# Patient Record
Sex: FEMALE | Race: WHITE | NOT HISPANIC OR LATINO | URBAN - NONMETROPOLITAN AREA
[De-identification: names, ages, dates, MRNs, and addresses within clinical notes are randomized per-mention and may not be internally consistent; named-entity substitution may affect disease eponyms.]

---

## 2018-07-11 PROBLEM — H43.813: Noted: 2018-07-11

## 2018-07-11 PROBLEM — H43.811: Noted: 2018-07-11

## 2018-07-11 PROBLEM — H35.3131: Noted: 2018-07-11

## 2018-07-11 PROBLEM — Z96.1: Noted: 2018-01-24

## 2018-07-11 PROBLEM — H35.373: Noted: 2018-07-11

## 2018-07-11 PROBLEM — H52.4: Noted: 2018-11-27

## 2018-11-27 PROBLEM — H35.373: Noted: 2018-07-11

## 2018-11-27 PROBLEM — Z96.1: Noted: 2018-01-24

## 2018-11-27 PROBLEM — H35.3131: Noted: 2018-07-11

## 2018-11-27 PROBLEM — H43.813: Noted: 2018-07-11

## 2018-11-27 PROBLEM — H52.4: Noted: 2018-11-27

## 2019-06-12 ENCOUNTER — IMPORTED ENCOUNTER (OUTPATIENT)
Dept: URBAN - NONMETROPOLITAN AREA CLINIC 1 | Facility: CLINIC | Age: 74
End: 2019-06-12

## 2019-06-12 PROCEDURE — 92014 COMPRE OPH EXAM EST PT 1/>: CPT

## 2019-06-12 PROCEDURE — 92134 CPTRZ OPH DX IMG PST SGM RTA: CPT

## 2019-06-12 NOTE — PATIENT DISCUSSION
ARMD ERM OU:  -  Discussed diagnosis in detail w/ pt  today-  OCT done today stable OU with drusen noted. -  Optos done previously shows drusen OU but stable from previous -  IVFA done by Dr. Kacey Almanzar in the past (8/12/16). -  Continue to take eye vitamins daily such as Preservision-  Continue to follow the [address] [place] [address] patient to call or come into the office if any changes noted from today-  Continue to monitor PVD OD w/extensive floaters:-  Discussed findings of exam in detail with the patient.-  Stable on exam today with no holes tears or detachements noted. -  The risk of retinal detachment in patients with PVDs was discussed with the patient and the warning signs of retinal detachment were carefully reviewed with the patient. -  The patient was warned to return to the office or contact the ophthalmologist on call immediately if they experience signs of retinal detachment or changes in vision noted from today. -  Continue to monitor PRN. P/C IOL (TMF) OU-  Discussed diagnosis in detail w/ pt today-  Stable doing well. -  Monitor yearly or PRN.  Presbyopia OU-  Discussed diagnosis in detail with patient -  Continue to monitor; Dr's Notes: RM 11/27/18 (AOA)DFE  6/12/19OCT mac  6/12/19Optos  11/27/18

## 2019-12-12 ENCOUNTER — IMPORTED ENCOUNTER (OUTPATIENT)
Dept: URBAN - NONMETROPOLITAN AREA CLINIC 1 | Facility: CLINIC | Age: 74
End: 2019-12-12

## 2019-12-12 PROCEDURE — 92250 FUNDUS PHOTOGRAPHY W/I&R: CPT

## 2019-12-12 PROCEDURE — 92014 COMPRE OPH EXAM EST PT 1/>: CPT

## 2019-12-12 NOTE — PATIENT DISCUSSION
ARMD ERM OU:  -  Discussed diagnosis in detail w/ pt  today-  OCT done previously stable OU with drusen noted. -  Optos done today shows drusen OU but stable from previous -  IVFA done by Dr. Rosa Hayes in the past (8/12/16). -  Continue to take eye vitamins daily such as Preservision-  Continue to follow the [address] [place] [address] patient to call or come into the office if any changes noted from today-  Continue to monitor PVD OD w/extensive floaters:-  Discussed findings of exam in detail with the patient.-  Stable on exam today with no holes tears or detachements noted. -  The risk of retinal detachment in patients with PVDs was discussed with the patient and the warning signs of retinal detachment were carefully reviewed with the patient. -  The patient was warned to return to the office or contact the ophthalmologist on call immediately if they experience signs of retinal detachment or changes in vision noted from today. -  Continue to monitor PRN. P/C IOL (TMF) OU-  Discussed diagnosis in detail w/ pt today-  Stable doing well. -  Monitor yearly or PRN.  Presbyopia OU-  Discussed diagnosis in detail with patient -  New glasses Rx given today-  Continue to monitor; Dr's Notes: RM 12/12/19 (AOA)DFE  12/12/19OCT mac  6/12/19Optos  12/12/19

## 2020-07-28 ENCOUNTER — IMPORTED ENCOUNTER (OUTPATIENT)
Dept: URBAN - NONMETROPOLITAN AREA CLINIC 1 | Facility: CLINIC | Age: 75
End: 2020-07-28

## 2020-07-28 PROCEDURE — 92014 COMPRE OPH EXAM EST PT 1/>: CPT

## 2020-07-28 PROCEDURE — 92134 CPTRZ OPH DX IMG PST SGM RTA: CPT

## 2020-07-28 NOTE — PATIENT DISCUSSION
ARMD ERM OU:  -  Discussed diagnosis in detail w/ pt  today-  OCT done today shows drusen and ERM OU but stable from previous -  Optos done previously shows drusen OU but stable from previous -  IVFA done by Dr. Jasiel Carpio in the past (8/12/16). -  Continue to take eye vitamins daily such as Preservision-  Continue to follow the [facility] patient to call or come into the office if any changes noted from today. Patient reports no changes in her grid 7/28/20-  Continue to monitor PVD OD w/extensive floaters:-  Discussed findings of exam in detail with the patient.-  Stable on exam today with no holes tears or detachements noted. -  The risk of retinal detachment in patients with PVDs was discussed with the patient and the warning signs of retinal detachment were carefully reviewed with the patient. -  The patient was warned to return to the office or contact the ophthalmologist on call immediately if they experience signs of retinal detachment or changes in vision noted from today. -  Continue to monitor PRN. P/C IOL (TMF) OU-  Discussed diagnosis in detail w/ pt today-  Stable doing well. -  Monitor yearly or PRN.  Presbyopia OU-  Discussed diagnosis in detail with patient -  Continue to monitor; Dr's Notes: RM 12/12/19 (AOA)DFE  12/12/19OCT mac  7/28/20Optos  12/12/19

## 2021-02-17 ENCOUNTER — IMPORTED ENCOUNTER (OUTPATIENT)
Dept: URBAN - NONMETROPOLITAN AREA CLINIC 1 | Facility: CLINIC | Age: 76
End: 2021-02-17

## 2021-02-17 PROCEDURE — 92250 FUNDUS PHOTOGRAPHY W/I&R: CPT

## 2021-02-17 PROCEDURE — 92014 COMPRE OPH EXAM EST PT 1/>: CPT

## 2021-02-17 NOTE — PATIENT DISCUSSION
ARMD ERM OU:  -  Discussed diagnosis in detail w/ pt  today-  OCT done previously shows drusen and ERM OU but stable from previous -  Optos done today shows drusen OU but stable from previous -  IVFA done by Dr. Nori Solorzano in the past (8/12/16). -  Continue to take eye vitamins daily such as Preservision-  Continue to follow the [address] [facility] patient to call or come into the office if any changes noted from today. Patient reports no changes in her grid 2/17/20-  Continue to monitor PVD OD w/extensive floaters:-  Discussed findings of exam in detail with the patient.-  Stable on exam today with no holes tears or detachements noted. -  The risk of retinal detachment in patients with PVDs was discussed with the patient and the warning signs of retinal detachment were carefully reviewed with the patient. -  The patient was warned to return to the office or contact the ophthalmologist on call immediately if they experience signs of retinal detachment or changes in vision noted from today. -  Continue to monitor PRN. P/C IOL (TMF) OU-  Discussed diagnosis in detail w/ pt today-  Stable doing well. -  Monitor yearly or PRN.  Presbyopia OU-  Discussed diagnosis in detail with patient -  New glasses RX given today-  Continue to monitor; Dr's Notes: RM 2/17/21 (AOA)DFE  12/12/19OCT mac  7/28/20Optos  2/17/21

## 2021-09-29 ENCOUNTER — IMPORTED ENCOUNTER (OUTPATIENT)
Dept: URBAN - NONMETROPOLITAN AREA CLINIC 1 | Facility: CLINIC | Age: 76
End: 2021-09-29

## 2021-09-29 PROCEDURE — 92134 CPTRZ OPH DX IMG PST SGM RTA: CPT

## 2021-09-29 PROCEDURE — 99214 OFFICE O/P EST MOD 30 MIN: CPT

## 2021-09-29 NOTE — PATIENT DISCUSSION
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 Kacey Almanzar in the past (8/12/16). -  Continue to take eye vitamins daily such as Preservision-  Continue to follow the [address] [facility] patient to call or come into the office if any changes noted from today. Patient reports no changes in her grid 9/29/21-  Continue to monitor PVD OD w/extensive floaters:-  Discussed findings of exam in detail with the patient.-  Stable on exam today with no holes tears or detachements noted. -  The risk of retinal detachment in patients with PVDs was discussed with the patient and the warning signs of retinal detachment were carefully reviewed with the patient. -  The patient was warned to return to the office or contact the ophthalmologist on call immediately if they experience signs of retinal detachment or changes in vision noted from today. -  Continue to monitor PRN. P/C IOL (TMF) OU-  Discussed diagnosis in detail w/ pt today-  Stable doing well. -  Monitor yearly or PRN.  Presbyopia OU-  Discussed diagnosis in detail with patient -  Continue to monitor; Dr's Notes: RM 2/17/21 (AOA)DFE  12/12/19OCT mac 9/29/21Optos  2/17/21

## 2022-04-09 ASSESSMENT — TONOMETRY
OD_IOP_MMHG: 12
OS_IOP_MMHG: 12
OD_IOP_MMHG: 12
OD_IOP_MMHG: 12
OS_IOP_MMHG: 12
OS_IOP_MMHG: 13
OD_IOP_MMHG: 13
OD_IOP_MMHG: 12
OS_IOP_MMHG: 12
OS_IOP_MMHG: 12

## 2022-04-09 ASSESSMENT — VISUAL ACUITY
OD_SC: 20/20-
OS_SC: 20/25+
OS_SC: 20/22-
OD_SC: 20/25-
OS_SC: 20/20
OD_SC: 20/29
OS_CC: 20/30-
OD_SC: 20/25-
OU_SC: 20/40
OD_CC: 20/30-
OS_SC: 20/25

## 2022-04-19 ENCOUNTER — EMERGENCY VISIT (OUTPATIENT)
Dept: URBAN - NONMETROPOLITAN AREA CLINIC 1 | Facility: CLINIC | Age: 77
End: 2022-04-19

## 2022-04-19 DIAGNOSIS — H43.811: ICD-10-CM

## 2022-04-19 DIAGNOSIS — H35.3131: ICD-10-CM

## 2022-04-19 PROCEDURE — 99213 OFFICE O/P EST LOW 20 MIN: CPT

## 2022-04-19 PROCEDURE — 92250 FUNDUS PHOTOGRAPHY W/I&R: CPT

## 2022-04-19 ASSESSMENT — TONOMETRY
OS_IOP_MMHG: 12
OD_IOP_MMHG: 12

## 2022-04-19 ASSESSMENT — VISUAL ACUITY
OS_PH: 20/25-1
OS_CC: 20/40
OD_CC: 20/20-2

## 2022-04-19 NOTE — PATIENT DISCUSSION
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 Maria Antonia Keita in the past (8/12/16). -  Continue to take eye vitamins daily such as Preservision-  Continue to follow the [address] [facility] patient to call or come into the office if any changes noted from today. Patient reports no changes in her grid 9/29/21-  Continue to monitor PVD OD w/extensive floaters:-  Discussed findings of exam in detail with the patient.-  Stable on exam today with no holes tears or detachements noted. -  The risk of retinal detachment in patients with PVDs was discussed with the patient and the warning signs of retinal detachment were carefully reviewed with the patient. -  The patient was warned to return to the office or contact the ophthalmologist on call immediately if they experience signs of retinal detachment or changes in vision noted from today. -  Continue to monitor PRN. P/C IOL (TMF) OU-  Discussed diagnosis in detail w/ pt today-  Stable doing well. -  Monitor yearly or PRN. Presbyopia OU-  Discussed diagnosis in detail with patient -  Continue to monitor; Dr's Notes: RM 2/17/21 (AOA)DFE  12/12/19OCT mac 9/29/21Optos  2/17/21.

## 2022-05-06 ENCOUNTER — EMERGENCY VISIT (OUTPATIENT)
Dept: URBAN - NONMETROPOLITAN AREA CLINIC 4 | Facility: CLINIC | Age: 77
End: 2022-05-06

## 2022-05-06 DIAGNOSIS — H43.813: ICD-10-CM

## 2022-05-06 PROCEDURE — 99213 OFFICE O/P EST LOW 20 MIN: CPT

## 2022-05-06 ASSESSMENT — VISUAL ACUITY
OS_PH: 20/25-2
OU_SC: 20/25
OD_SC: 20/40
OS_SC: 20/40
OD_PH: 20/25-2

## 2022-05-06 ASSESSMENT — TONOMETRY
OD_IOP_MMHG: 15
OS_IOP_MMHG: 16

## 2022-10-12 ENCOUNTER — FOLLOW UP (OUTPATIENT)
Dept: URBAN - NONMETROPOLITAN AREA CLINIC 1 | Facility: CLINIC | Age: 77
End: 2022-10-12

## 2022-10-12 DIAGNOSIS — H52.4: ICD-10-CM

## 2022-10-12 DIAGNOSIS — H43.813: ICD-10-CM

## 2022-10-12 DIAGNOSIS — H35.3131: ICD-10-CM

## 2022-10-12 DIAGNOSIS — H35.373: ICD-10-CM

## 2022-10-12 PROCEDURE — 92134 CPTRZ OPH DX IMG PST SGM RTA: CPT

## 2022-10-12 PROCEDURE — 99214 OFFICE O/P EST MOD 30 MIN: CPT

## 2022-10-12 PROCEDURE — 92015 DETERMINE REFRACTIVE STATE: CPT

## 2022-10-12 ASSESSMENT — TONOMETRY
OD_IOP_MMHG: 12
OS_IOP_MMHG: 12

## 2022-10-12 ASSESSMENT — VISUAL ACUITY
OS_CC: 20/20-2
OD_CC: 20/25-2

## 2023-04-13 ENCOUNTER — FOLLOW UP (OUTPATIENT)
Dept: URBAN - NONMETROPOLITAN AREA CLINIC 1 | Facility: CLINIC | Age: 78
End: 2023-04-13

## 2023-04-13 DIAGNOSIS — H43.813: ICD-10-CM

## 2023-04-13 DIAGNOSIS — H35.3131: ICD-10-CM

## 2023-04-13 DIAGNOSIS — H35.373: ICD-10-CM

## 2023-04-13 PROCEDURE — 92250 FUNDUS PHOTOGRAPHY W/I&R: CPT

## 2023-04-13 PROCEDURE — 99214 OFFICE O/P EST MOD 30 MIN: CPT

## 2023-04-13 ASSESSMENT — VISUAL ACUITY
OD_CC: 20/25
OS_CC: 20/30

## 2023-04-13 ASSESSMENT — TONOMETRY
OD_IOP_MMHG: 15
OS_IOP_MMHG: 15

## 2023-11-07 ENCOUNTER — ESTABLISHED PATIENT (OUTPATIENT)
Dept: URBAN - NONMETROPOLITAN AREA CLINIC 1 | Facility: CLINIC | Age: 78
End: 2023-11-07

## 2023-11-07 DIAGNOSIS — H35.373: ICD-10-CM

## 2023-11-07 DIAGNOSIS — H35.3131: ICD-10-CM

## 2023-11-07 DIAGNOSIS — H52.4: ICD-10-CM

## 2023-11-07 PROCEDURE — 92134 CPTRZ OPH DX IMG PST SGM RTA: CPT

## 2023-11-07 PROCEDURE — 99214 OFFICE O/P EST MOD 30 MIN: CPT

## 2023-11-07 ASSESSMENT — VISUAL ACUITY
OU_CC: 20/25-2
OS_CC: 20/29-
OD_CC: 20/29-2

## 2023-11-07 ASSESSMENT — TONOMETRY
OS_IOP_MMHG: 14
OD_IOP_MMHG: 14

## 2024-03-18 NOTE — PATIENT DISCUSSION
Glasses Rx given. Is This A New Presentation, Or A Follow-Up?: Basal Cell Carcinoma No. DARRELL screening performed.  STOP BANG Legend: 0-2 = LOW Risk; 3-4 = INTERMEDIATE Risk; 5-8 = HIGH Risk

## 2024-05-10 ENCOUNTER — FOLLOW UP (OUTPATIENT)
Dept: URBAN - NONMETROPOLITAN AREA CLINIC 1 | Facility: CLINIC | Age: 79
End: 2024-05-10

## 2024-05-10 DIAGNOSIS — H35.3131: ICD-10-CM

## 2024-05-10 DIAGNOSIS — H35.373: ICD-10-CM

## 2024-05-10 PROCEDURE — 99213 OFFICE O/P EST LOW 20 MIN: CPT

## 2024-05-10 PROCEDURE — 92250 FUNDUS PHOTOGRAPHY W/I&R: CPT

## 2024-05-10 ASSESSMENT — VISUAL ACUITY
OU_SC: 20/30-1
OD_SC: 20/40-1
OS_SC: 20/40-1

## 2024-05-10 ASSESSMENT — TONOMETRY
OD_IOP_MMHG: 10
OS_IOP_MMHG: 10

## 2024-11-14 ENCOUNTER — FOLLOW UP (OUTPATIENT)
Dept: URBAN - NONMETROPOLITAN AREA CLINIC 1 | Facility: CLINIC | Age: 79
End: 2024-11-14

## 2024-11-14 DIAGNOSIS — H35.373: ICD-10-CM

## 2024-11-14 DIAGNOSIS — H35.3131: ICD-10-CM

## 2024-11-14 PROCEDURE — 92250 FUNDUS PHOTOGRAPHY W/I&R: CPT

## 2024-11-14 PROCEDURE — 99213 OFFICE O/P EST LOW 20 MIN: CPT

## 2025-05-22 ENCOUNTER — FOLLOW UP (OUTPATIENT)
Age: 80
End: 2025-05-22

## 2025-05-22 DIAGNOSIS — Z96.1: ICD-10-CM

## 2025-05-22 DIAGNOSIS — H43.813: ICD-10-CM

## 2025-05-22 DIAGNOSIS — H35.373: ICD-10-CM

## 2025-05-22 DIAGNOSIS — H35.3131: ICD-10-CM

## 2025-05-22 DIAGNOSIS — H52.4: ICD-10-CM

## 2025-05-22 PROCEDURE — 92134 CPTRZ OPH DX IMG PST SGM RTA: CPT

## 2025-05-22 PROCEDURE — 92015 DETERMINE REFRACTIVE STATE: CPT

## 2025-05-22 PROCEDURE — 99213 OFFICE O/P EST LOW 20 MIN: CPT
